# Patient Record
Sex: MALE | Race: WHITE | NOT HISPANIC OR LATINO
[De-identification: names, ages, dates, MRNs, and addresses within clinical notes are randomized per-mention and may not be internally consistent; named-entity substitution may affect disease eponyms.]

---

## 2018-11-28 PROBLEM — Z00.00 ENCOUNTER FOR PREVENTIVE HEALTH EXAMINATION: Status: ACTIVE | Noted: 2018-11-28

## 2018-12-10 ENCOUNTER — APPOINTMENT (OUTPATIENT)
Dept: OTOLARYNGOLOGY | Facility: CLINIC | Age: 48
End: 2018-12-10
Payer: COMMERCIAL

## 2018-12-10 VITALS
DIASTOLIC BLOOD PRESSURE: 80 MMHG | BODY MASS INDEX: 22.96 KG/M2 | HEIGHT: 69 IN | SYSTOLIC BLOOD PRESSURE: 124 MMHG | WEIGHT: 155 LBS

## 2018-12-10 DIAGNOSIS — Q85.01 NEUROFIBROMATOSIS, TYPE 1: ICD-10-CM

## 2018-12-10 DIAGNOSIS — Z78.9 OTHER SPECIFIED HEALTH STATUS: ICD-10-CM

## 2018-12-10 PROCEDURE — 99204 OFFICE O/P NEW MOD 45 MIN: CPT | Mod: 25

## 2018-12-10 PROCEDURE — 92550 TYMPANOMETRY & REFLEX THRESH: CPT

## 2018-12-10 PROCEDURE — 92504 EAR MICROSCOPY EXAMINATION: CPT

## 2018-12-10 PROCEDURE — 92557 COMPREHENSIVE HEARING TEST: CPT

## 2018-12-18 ENCOUNTER — FORM ENCOUNTER (OUTPATIENT)
Age: 48
End: 2018-12-18

## 2018-12-19 ENCOUNTER — OUTPATIENT (OUTPATIENT)
Dept: OUTPATIENT SERVICES | Facility: HOSPITAL | Age: 48
LOS: 1 days | Discharge: HOME | End: 2018-12-19

## 2018-12-19 DIAGNOSIS — H93.8X2 OTHER SPECIFIED DISORDERS OF LEFT EAR: ICD-10-CM

## 2018-12-28 ENCOUNTER — OTHER (OUTPATIENT)
Age: 48
End: 2018-12-28

## 2019-01-03 ENCOUNTER — APPOINTMENT (OUTPATIENT)
Dept: OTOLARYNGOLOGY | Facility: CLINIC | Age: 49
End: 2019-01-03

## 2019-01-06 ENCOUNTER — FORM ENCOUNTER (OUTPATIENT)
Age: 49
End: 2019-01-06

## 2019-01-07 ENCOUNTER — OUTPATIENT (OUTPATIENT)
Dept: OUTPATIENT SERVICES | Facility: HOSPITAL | Age: 49
LOS: 1 days | Discharge: HOME | End: 2019-01-07

## 2019-01-07 DIAGNOSIS — H93.8X2 OTHER SPECIFIED DISORDERS OF LEFT EAR: ICD-10-CM

## 2019-01-17 ENCOUNTER — APPOINTMENT (OUTPATIENT)
Dept: OTOLARYNGOLOGY | Facility: CLINIC | Age: 49
End: 2019-01-17

## 2019-03-14 ENCOUNTER — APPOINTMENT (OUTPATIENT)
Dept: OTOLARYNGOLOGY | Facility: CLINIC | Age: 49
End: 2019-03-14
Payer: COMMERCIAL

## 2019-03-14 DIAGNOSIS — H90.A21 SENSORINEURAL HEARING LOSS, UNILATERAL, RIGHT EAR, WITH RESTRICTED HEARING ON THE CONTRALATERAL SIDE: ICD-10-CM

## 2019-03-14 PROCEDURE — 99214 OFFICE O/P EST MOD 30 MIN: CPT

## 2019-03-14 NOTE — DATA REVIEWED
[de-identified] : Impression: \par \par Left temporal bone: Soft tissue lesion within Prussak's space with adjacent \par osseous erosions compatible with a pars flaccida cholesteatoma. Thinning or \par erosion of the tegmen tympani. This corresponds to the enhancing lesion seen \par on MRI. \par \par Right temporal bone: Unremarkable CT of the right temporal bone. 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 \par Recommend dental evaluation. \par \par \par \par EXAM: MR IAC ONLY WAW IC \par EXAM: MR BRAIN WAW IC \par \par \par PROCEDURE DATE: 12/19/2018 \par \par \par \par \par INTERPRETATION: Clinical History / Reason for exam: Left ear mass. \par \par TECHNIQUE: MRI brain and IACs with and without contrast. Multiplanar \par multisequential MRI of the brain and IACs was performed before and following \par the intravenous administration of 7 cc Gadavist (0.5 cc discarded) on the \par 1.5 Lindsay magnet. \par \par COMPARISON: None available. \par \par FINDINGS: \par \par MRI brain: \par \par The ventricles, basal cisterns and sulcal pattern are within normal limits \par for patients stated age. \par \par There is no acute mass effect, midline shift or hemorrhage. \par \par There are no acute infarcts on diffusion weighted images. \par \par There is a tortuous course of the right distal vertebral artery with \par adjacent mass effect on the right mid to lower, which may be a normal \par variant. \par \par There is left maxillary sinus mucosal thickening and a small retention cyst. \par \par Globes and orbits are grossly within normal limits. \par \par There is no bone marrow signal abnormality. The sella is unremarkable. \par \par There is prominence of retrocerebellar CSF space which may be related to an \par arachnoid cyst versus qasim cisterna magna. \par \par MRI IACs: \par \par Right side: The cerebellopontine angle cistern is unremarkable. The CNs VII \par and VIII are visualized. There is normal CSF signal within the cochlea, \par vestibule and semicircular canals. There is normal signal void within the \par mastoid air cells and external auditory canal. \par \par Left side: The CNs VII and VIII are visualized. The cerebellopontine angle \par cistern is unremarkable. There is normal CSF signal within the cochlea, \par vestibule and semicircular canals. There is apparent enhancement and \par thickening of the left tympanic membrane with a small focus of nodular \par enhancement anteriorly (series 15, image 31). Small fluid is noted within \par the left mastoid air cells. \par \par \par IMPRESSION: \par \par MRI brain: Essentially unremarkable study. \par \par MRI IACs: \par \par 1. Apparent linear and nodular enhancement anteriorly within the left \par middle ear; recommend CT temporal bone for more detailed evaluation. \par \par 2. Fluid within the left mastoid air cells. \par \par \par \par \par \par \par MÓNICA HARRIS M.D., RESIDENT RADIOLOGIST \par This document has been electronically signed. \par VALENTINE GOMEZ M.D., ATTENDING RADIOLOGIST \par This document has been electronically signed. Dec 20 2018 4:44PM \par

## 2019-03-14 NOTE — CONSULT LETTER
[Dear  ___] : Dear  [unfilled], [Consult Letter:] : I had the pleasure of evaluating your patient, [unfilled]. [Please see my note below.] : Please see my note below. [Consult Closing:] : Thank you very much for allowing me to participate in the care of this patient.  If you have any questions, please do not hesitate to contact me. [Sincerely,] : Sincerely, [FreeTextEntry2] : Dr. Jeff Sanabria [FreeTextEntry3] : Joan Colon MD\par Otolaryngology - Head & Neck Surgery\par

## 2019-03-14 NOTE — PHYSICAL EXAM
[Normal] : no rashes [de-identified] : left TM intact, pale white lesion in superior anterior aspect of TM, medial to TM. Right TM intact, no masses or lesions in middle ear

## 2019-03-14 NOTE — HISTORY OF PRESENT ILLNESS
[de-identified] : 49 yo M with pulsatile tinnitus and hearing loss since October 2018. Denies hx ear drainage, ear infections, ear surgeries. Audiogram demonstrates conductive hearing loss. CT scan done, suggests cholesteatoma left ear. MRI also done. No changes since previous visit.

## 2019-03-14 NOTE — ASSESSMENT
[FreeTextEntry1] : - patient with left ear epitympanic mass, likely cholesteatoma. Discussed role of left tympanomastoidectomy, canal wall up, possible canal wall down, facial nerve monitoring. Discussed risks, benefits, and alternatives to treatment in extensive detail. Patient asked questions and these were answered to his apparent satisfaction. Patient gave informed written consent.\par - f/up 1 week post-op\par

## 2019-05-07 ENCOUNTER — OUTPATIENT (OUTPATIENT)
Dept: OUTPATIENT SERVICES | Facility: HOSPITAL | Age: 49
LOS: 1 days | Discharge: HOME | End: 2019-05-07
Payer: COMMERCIAL

## 2019-05-07 VITALS
WEIGHT: 154.98 LBS | HEART RATE: 76 BPM | SYSTOLIC BLOOD PRESSURE: 130 MMHG | TEMPERATURE: 98 F | RESPIRATION RATE: 16 BRPM | DIASTOLIC BLOOD PRESSURE: 82 MMHG | OXYGEN SATURATION: 97 % | HEIGHT: 69 IN

## 2019-05-07 DIAGNOSIS — Z86.69 PERSONAL HISTORY OF OTHER DISEASES OF THE NERVOUS SYSTEM AND SENSE ORGANS: ICD-10-CM

## 2019-05-07 DIAGNOSIS — Z01.818 ENCOUNTER FOR OTHER PREPROCEDURAL EXAMINATION: ICD-10-CM

## 2019-05-07 DIAGNOSIS — Z98.890 OTHER SPECIFIED POSTPROCEDURAL STATES: Chronic | ICD-10-CM

## 2019-05-07 DIAGNOSIS — H90.A21 SENSORINEURAL HEARING LOSS, UNILATERAL, RIGHT EAR, WITH RESTRICTED HEARING ON THE CONTRALATERAL SIDE: ICD-10-CM

## 2019-05-07 LAB
ALBUMIN SERPL ELPH-MCNC: 4.7 G/DL — SIGNIFICANT CHANGE UP (ref 3.5–5.2)
ALP SERPL-CCNC: 60 U/L — SIGNIFICANT CHANGE UP (ref 30–115)
ALT FLD-CCNC: 36 U/L — SIGNIFICANT CHANGE UP (ref 0–41)
ANION GAP SERPL CALC-SCNC: 12 MMOL/L — SIGNIFICANT CHANGE UP (ref 7–14)
AST SERPL-CCNC: 20 U/L — SIGNIFICANT CHANGE UP (ref 0–41)
BASOPHILS # BLD AUTO: 0.03 K/UL — SIGNIFICANT CHANGE UP (ref 0–0.2)
BASOPHILS NFR BLD AUTO: 0.6 % — SIGNIFICANT CHANGE UP (ref 0–1)
BILIRUB SERPL-MCNC: 2 MG/DL — HIGH (ref 0.2–1.2)
BUN SERPL-MCNC: 13 MG/DL — SIGNIFICANT CHANGE UP (ref 10–20)
CALCIUM SERPL-MCNC: 9.5 MG/DL — SIGNIFICANT CHANGE UP (ref 8.5–10.1)
CHLORIDE SERPL-SCNC: 102 MMOL/L — SIGNIFICANT CHANGE UP (ref 98–110)
CO2 SERPL-SCNC: 27 MMOL/L — SIGNIFICANT CHANGE UP (ref 17–32)
CREAT SERPL-MCNC: 0.8 MG/DL — SIGNIFICANT CHANGE UP (ref 0.7–1.5)
EOSINOPHIL # BLD AUTO: 0.07 K/UL — SIGNIFICANT CHANGE UP (ref 0–0.7)
EOSINOPHIL NFR BLD AUTO: 1.4 % — SIGNIFICANT CHANGE UP (ref 0–8)
GLUCOSE SERPL-MCNC: 96 MG/DL — SIGNIFICANT CHANGE UP (ref 70–99)
HCT VFR BLD CALC: 46.7 % — SIGNIFICANT CHANGE UP (ref 42–52)
HGB BLD-MCNC: 16 G/DL — SIGNIFICANT CHANGE UP (ref 14–18)
IMM GRANULOCYTES NFR BLD AUTO: 0.4 % — HIGH (ref 0.1–0.3)
LYMPHOCYTES # BLD AUTO: 1.23 K/UL — SIGNIFICANT CHANGE UP (ref 1.2–3.4)
LYMPHOCYTES # BLD AUTO: 24 % — SIGNIFICANT CHANGE UP (ref 20.5–51.1)
MCHC RBC-ENTMCNC: 28.5 PG — SIGNIFICANT CHANGE UP (ref 27–31)
MCHC RBC-ENTMCNC: 34.3 G/DL — SIGNIFICANT CHANGE UP (ref 32–37)
MCV RBC AUTO: 83.1 FL — SIGNIFICANT CHANGE UP (ref 80–94)
MONOCYTES # BLD AUTO: 0.52 K/UL — SIGNIFICANT CHANGE UP (ref 0.1–0.6)
MONOCYTES NFR BLD AUTO: 10.2 % — HIGH (ref 1.7–9.3)
NEUTROPHILS # BLD AUTO: 3.25 K/UL — SIGNIFICANT CHANGE UP (ref 1.4–6.5)
NEUTROPHILS NFR BLD AUTO: 63.4 % — SIGNIFICANT CHANGE UP (ref 42.2–75.2)
NRBC # BLD: 0 /100 WBCS — SIGNIFICANT CHANGE UP (ref 0–0)
PLATELET # BLD AUTO: 219 K/UL — SIGNIFICANT CHANGE UP (ref 130–400)
POTASSIUM SERPL-MCNC: 4.2 MMOL/L — SIGNIFICANT CHANGE UP (ref 3.5–5)
POTASSIUM SERPL-SCNC: 4.2 MMOL/L — SIGNIFICANT CHANGE UP (ref 3.5–5)
PROT SERPL-MCNC: 7.3 G/DL — SIGNIFICANT CHANGE UP (ref 6–8)
RBC # BLD: 5.62 M/UL — SIGNIFICANT CHANGE UP (ref 4.7–6.1)
RBC # FLD: 12.3 % — SIGNIFICANT CHANGE UP (ref 11.5–14.5)
SODIUM SERPL-SCNC: 141 MMOL/L — SIGNIFICANT CHANGE UP (ref 135–146)
WBC # BLD: 5.12 K/UL — SIGNIFICANT CHANGE UP (ref 4.8–10.8)
WBC # FLD AUTO: 5.12 K/UL — SIGNIFICANT CHANGE UP (ref 4.8–10.8)

## 2019-05-07 PROCEDURE — 93010 ELECTROCARDIOGRAM REPORT: CPT

## 2019-05-07 NOTE — H&P PST ADULT - HISTORY OF PRESENT ILLNESS
48yr old male states has been hearing "muffled in the left ear"- for the last few months, WAS FOUND TO HAVE CHOLESTEATOMA -presents for left mastoidectomy, tympanomastoidectomy. Denies c/o CP, PALP, SOB, URI, FEVER, RASH OR UTI SYMPTOMS. Exercise matias 3-4 FOS.

## 2019-05-20 ENCOUNTER — OTHER (OUTPATIENT)
Age: 49
End: 2019-05-20

## 2019-05-20 DIAGNOSIS — G89.18 OTHER ACUTE POSTPROCEDURAL PAIN: ICD-10-CM

## 2019-05-21 ENCOUNTER — RESULT REVIEW (OUTPATIENT)
Age: 49
End: 2019-05-21

## 2019-05-21 ENCOUNTER — OUTPATIENT (OUTPATIENT)
Dept: OUTPATIENT SERVICES | Facility: HOSPITAL | Age: 49
LOS: 1 days | Discharge: HOME | End: 2019-05-21
Payer: COMMERCIAL

## 2019-05-21 ENCOUNTER — APPOINTMENT (OUTPATIENT)
Dept: OTOLARYNGOLOGY | Facility: AMBULATORY SURGERY CENTER | Age: 49
End: 2019-05-21
Payer: COMMERCIAL

## 2019-05-21 VITALS
SYSTOLIC BLOOD PRESSURE: 134 MMHG | WEIGHT: 154.98 LBS | RESPIRATION RATE: 15 BRPM | HEART RATE: 70 BPM | TEMPERATURE: 98 F | DIASTOLIC BLOOD PRESSURE: 79 MMHG | HEIGHT: 69 IN | OXYGEN SATURATION: 96 %

## 2019-05-21 VITALS
RESPIRATION RATE: 16 BRPM | SYSTOLIC BLOOD PRESSURE: 134 MMHG | HEART RATE: 84 BPM | OXYGEN SATURATION: 96 % | DIASTOLIC BLOOD PRESSURE: 83 MMHG

## 2019-05-21 DIAGNOSIS — Z98.890 OTHER SPECIFIED POSTPROCEDURAL STATES: Chronic | ICD-10-CM

## 2019-05-21 PROCEDURE — 88304 TISSUE EXAM BY PATHOLOGIST: CPT | Mod: 26

## 2019-05-21 PROCEDURE — 69310 REBUILD OUTER EAR CANAL: CPT | Mod: 59

## 2019-05-21 PROCEDURE — 69645 REVISE MIDDLE EAR & MASTOID: CPT | Mod: LT

## 2019-05-21 RX ORDER — SODIUM CHLORIDE 9 MG/ML
1000 INJECTION, SOLUTION INTRAVENOUS
Refills: 0 | Status: DISCONTINUED | OUTPATIENT
Start: 2019-05-21 | End: 2019-06-05

## 2019-05-21 RX ORDER — OXYCODONE AND ACETAMINOPHEN 5; 325 MG/1; MG/1
1 TABLET ORAL EVERY 4 HOURS
Refills: 0 | Status: DISCONTINUED | OUTPATIENT
Start: 2019-05-21 | End: 2019-05-21

## 2019-05-21 RX ORDER — MORPHINE SULFATE 50 MG/1
2 CAPSULE, EXTENDED RELEASE ORAL
Refills: 0 | Status: DISCONTINUED | OUTPATIENT
Start: 2019-05-21 | End: 2019-05-21

## 2019-05-21 RX ORDER — ONDANSETRON 8 MG/1
4 TABLET, FILM COATED ORAL ONCE
Refills: 0 | Status: DISCONTINUED | OUTPATIENT
Start: 2019-05-21 | End: 2019-06-05

## 2019-05-21 RX ADMIN — SODIUM CHLORIDE 100 MILLILITER(S): 9 INJECTION, SOLUTION INTRAVENOUS at 16:36

## 2019-05-21 NOTE — ASU DISCHARGE PLAN (ADULT/PEDIATRIC) - ACTIVITY LEVEL
no heavy lifting or strenuous activity for 2 weeks no heavy lifting or strenuous activity for 2 weeks. Do not blow nose for 2 weeks.

## 2019-05-21 NOTE — ASU DISCHARGE PLAN (ADULT/PEDIATRIC) - CARE PROVIDER_API CALL
Joan Colon)  Otolaryngology  [address] [place] [address], [address] [place] [address]  Phone: [phone]  Fax: [phone]  Follow Up Time: 1 week

## 2019-05-21 NOTE — ASU DISCHARGE PLAN (ADULT/PEDIATRIC) - PAIN MANAGEMENT
Prescription called to pharmacy/Can also take Tylenol and/or Motrin. Do not exceed 3500mg/day of Tylenol

## 2019-05-21 NOTE — BRIEF OPERATIVE NOTE - OPERATION/FINDINGS
cholesteatoma involving epitympanum, surrounding head of incus, extending medially to it cholesteatoma involving epitympanum, surrounding head of incus in all directions, all visualized disease removed

## 2019-05-21 NOTE — CHART NOTE - NSCHARTNOTEFT_GEN_A_CORE
PACU ANESTHESIA ADMISSION NOTE      Procedure: Left tympanomastoidectomy    Post op diagnosis:  Cholesteatoma of left mastoid      ____  Intubated  TV:______       Rate: ______      FiO2: ______    _x___  Patent Airway    ___x_  Full return of protective reflexes    __x__  Full recovery from anesthesia / back to baseline     Vitals:   T: 98          R: 18                 BP:    144/83              Sat:       95%            P: 93      Mental Status:  __x__ Awake   _____ Alert   _____ Drowsy   _____ Sedated    Nausea/Vomiting:  ____x NO  ______Yes,   See Post - Op Orders          Pain Scale (0-10):  _____    Treatment: _x___ None    ____ See Post - Op/PCA Orders    Post - Operative Fluids:   ____ Oral   ___x_ See Post - Op Orders    Plan: Discharge:   __x__Home       _____Floor     _____Critical Care    _____  Other:_________________    Comments:  Dr. Haywood present for extuabtion,.

## 2019-05-21 NOTE — ASU DISCHARGE PLAN (ADULT/PEDIATRIC) - ASU DC SPECIAL INSTRUCTIONSFT
Keep left ear cup in place for 48 hours. After 48 hours, ok to remove it. You can wear it during the day and at night while sleeping for comfort if you would like. If you decide not to wear the cup, do not remove anything in the ear and place a cotton ball in the left ear and secure it with a band-aid.

## 2019-05-21 NOTE — PRE-ANESTHESIA EVALUATION ADULT - NSANTHOSAYNRD_GEN_A_CORE
No. KEEGAN screening performed.  STOP BANG Legend: 0-2 = LOW Risk; 3-4 = INTERMEDIATE Risk; 5-8 = HIGH Risk

## 2019-05-23 LAB — SURGICAL PATHOLOGY STUDY: SIGNIFICANT CHANGE UP

## 2019-05-25 DIAGNOSIS — H90.12 CONDUCTIVE HEARING LOSS, UNILATERAL, LEFT EAR, WITH UNRESTRICTED HEARING ON THE CONTRALATERAL SIDE: ICD-10-CM

## 2019-05-25 DIAGNOSIS — H71.92 UNSPECIFIED CHOLESTEATOMA, LEFT EAR: ICD-10-CM

## 2019-05-25 DIAGNOSIS — Q85.01 NEUROFIBROMATOSIS, TYPE 1: ICD-10-CM

## 2019-05-31 ENCOUNTER — APPOINTMENT (OUTPATIENT)
Dept: OTOLARYNGOLOGY | Facility: CLINIC | Age: 49
End: 2019-05-31
Payer: COMMERCIAL

## 2019-05-31 PROBLEM — Z86.69 PERSONAL HISTORY OF OTHER DISEASES OF THE NERVOUS SYSTEM AND SENSE ORGANS: Chronic | Status: ACTIVE | Noted: 2019-05-07

## 2019-05-31 PROCEDURE — 99024 POSTOP FOLLOW-UP VISIT: CPT

## 2019-05-31 NOTE — CONSULT LETTER
[Dear  ___] : Dear  [unfilled], [Consult Letter:] : I had the pleasure of evaluating your patient, [unfilled]. [Please see my note below.] : Please see my note below. [Consult Closing:] : Thank you very much for allowing me to participate in the care of this patient.  If you have any questions, please do not hesitate to contact me. [Sincerely,] : Sincerely, [FreeTextEntry2] : Dr. Jeff Sanabria [FreeTextEntry1] : I took Mr Araya to the OR on 5/21/19 for cholesteatoma of the left ear. He is here today for his first post-op visit, overall doing well.  [FreeTextEntry3] : Joan Colon MD\par Otolaryngology - Head & Neck Surgery\par

## 2019-05-31 NOTE — HISTORY OF PRESENT ILLNESS
[de-identified] : 49 yo M with pulsatile tinnitus and hearing loss since October 2018. Denies hx ear drainage, ear infections, ear surgeries. Audiogram demonstrates conductive hearing loss. CT scan done, suggests cholesteatoma left ear. MRI also done. No changes since previous visit.  [FreeTextEntry1] : \par 5/31/19: Patient here post op CWD tympanomastoidectomy, facial nerve monitoring 5/21/19 for left ear cholesteatoma.   Patient states he has discomfort only at night, and rates his pain on a scale of  0-10 a 5.

## 2019-05-31 NOTE — ASSESSMENT
[FreeTextEntry1] : - start using abx otic gtt to left ear\par - f/up in 1 week\par - ok to return to work in 2 weeks after surgery

## 2019-05-31 NOTE — REASON FOR VISIT
[Post-Operative Visit] : a post-operative visit [FreeTextEntry2] : s/p  mastoidectomy, tympanomastoidectomy, facial nerve monitoring 5/21/19

## 2019-05-31 NOTE — PHYSICAL EXAM
[FreeTextEntry7] : post-auricular incision c/d/i [FreeTextEntry9] : packing in place, widened meatus. Lateral packing removed, additional packing remains in place [Normal] : no rashes

## 2019-06-06 ENCOUNTER — APPOINTMENT (OUTPATIENT)
Dept: OTOLARYNGOLOGY | Facility: CLINIC | Age: 49
End: 2019-06-06
Payer: COMMERCIAL

## 2019-06-06 PROCEDURE — 99024 POSTOP FOLLOW-UP VISIT: CPT

## 2019-06-06 NOTE — DATA REVIEWED
[de-identified] : relevant images and reports personally reviewed by me:\par \par  Pathology             Final\par \par No Documents Attached\par \par \par \par \par   Mary Accession Number : [id number]\par \par MARCOJASON HELLER JR             1\par \par \par \par Surgical Final Report\par \par \par \par \par Final Diagnosis\par Left epitympanic mass:\par - Histologic features consistent with cholesteatoma.\par \par Verified by: Adriano Pacheco\par (Electronic Signature)\par Reported on: 05/23/19 18:36 EDT, [address] [place] [address],[facility] [id number]\par _________________________________________________________________\par \par Clinical History\par Left tympanmastoidectomy, meatoplasty\par \par Specimen(s) Submitted\par Left epitympanic mass\par \par Gross Description\par The specimen is received in formalin, labeled "left epitympanic\par mass" and consists of three fragments of tiny brownish yellow\par soft tissue, measuring 0.5 x 0.3 x 0.2 cm in aggregate. The\par specimen is submitted entirely. (1 block)\par \par Specimen was received and underwent gross examination at [facility], [address] [place] [address],[facility] [address].\par \par 05/22/19 13:14 joel\par \par Perioperative Diagnosis\par Left ear cholesteatoma\par \par  \par \par  Ordered by: MADALYN HERMOSILLO       Collected/Examined: 21May2019 01:57PM       \par Verified by: MADALYN HERMOSILLO 24May2019 09:00AM       \par  Result Communication: No patient communication needed at this time;\par Stage: Final       \par  Performed at: [facility]       Resulted: 23May2019 06:36PM       Last Updated: 24May2019 09:00AM       Accession: [id number]

## 2019-06-06 NOTE — ASSESSMENT
[FreeTextEntry1] : - granulation tissue treated in left EAC meatus\par - continue ear drops\par - f/up 2 weeks

## 2019-06-06 NOTE — HISTORY OF PRESENT ILLNESS
[de-identified] : 49 yo M with pulsatile tinnitus and hearing loss since October 2018. Denies hx ear drainage, ear infections, ear surgeries. Audiogram demonstrates conductive hearing loss. CT scan done, suggests cholesteatoma left ear. MRI also done. No changes since previous visit. \par \par 5/31/19: Patient here post op CWD tympanomastoidectomy, facial nerve monitoring 5/21/19 for left ear cholesteatoma.   Patient states he has discomfort only at night, and rates his pain on a scale of  0-10 a 5.  [FreeTextEntry1] : \par 6/6/19: 2nd post-op visit. Using ear drops, occasionally sharp pain that lasts less than a second, otherwise no issues.

## 2019-06-06 NOTE — PHYSICAL EXAM
[FreeTextEntry9] :  packing in place, widened meatus. ~2/3 of remaining packing removed. Granulation tissue present at meatus, treated with silver nitrate. Post-auricular incision c/d/i, healing well [Normal] : no rashes

## 2019-06-24 ENCOUNTER — APPOINTMENT (OUTPATIENT)
Dept: OTOLARYNGOLOGY | Facility: CLINIC | Age: 49
End: 2019-06-24
Payer: COMMERCIAL

## 2019-06-24 PROCEDURE — 99024 POSTOP FOLLOW-UP VISIT: CPT

## 2019-06-24 NOTE — HISTORY OF PRESENT ILLNESS
[de-identified] : 47 yo M with pulsatile tinnitus and hearing loss since October 2018. Denies hx ear drainage, ear infections, ear surgeries. Audiogram demonstrates conductive hearing loss. CT scan done, suggests cholesteatoma left ear. MRI also done. No changes since previous visit. \par \par 5/31/19: Patient here post op CWD tympanomastoidectomy, facial nerve monitoring 5/21/19 for left ear cholesteatoma.   Patient states he has discomfort only at night, and rates his pain on a scale of  0-10 a 5. \par \par 6/6/19: 2nd post-op visit. Using ear drops, occasionally sharp pain that lasts less than a second, otherwise no issues. [FreeTextEntry1] : \par  6/24/19: using ear drops, No pain. No issues. Back at work.

## 2019-06-24 NOTE — REASON FOR VISIT
[Post-Operative Visit] : a post-operative visit [FreeTextEntry2] : s/p  mastoidectomy, tympanomastoidectomy, facial nerve monitoring 5/21/19 f/up

## 2019-06-24 NOTE — PHYSICAL EXAM
[FreeTextEntry9] :  packing in place, widened meatus. remainder of packing removed, healing/epithelializing tissue.  Post-auricular incision c/d/i, healing well [Normal] : no rashes

## 2019-06-24 NOTE — ASSESSMENT
[FreeTextEntry1] : - continues to heal well\par - continue ear drops\par - ok for swimming pool, dry ear immediately after with hair dryer, avoid prolonged submerging of head\par - f/up in 1 month

## 2019-07-23 ENCOUNTER — APPOINTMENT (OUTPATIENT)
Dept: OTOLARYNGOLOGY | Facility: CLINIC | Age: 49
End: 2019-07-23
Payer: COMMERCIAL

## 2019-07-23 PROCEDURE — 99024 POSTOP FOLLOW-UP VISIT: CPT

## 2019-07-23 NOTE — ASSESSMENT
[FreeTextEntry1] : - well healed s/p CWD tympanomastoidectomy\par - ok to resume normal activity including swimming\par - will obtain CT temporal bone for new baseline exam. Will call with abnormal results\par - f/up in 4 months

## 2019-07-23 NOTE — PHYSICAL EXAM
[FreeTextEntry9] :  widened meatus, TM intact. Post-auricular incision well healed [Normal] : orientation to person, place, and time: normal

## 2019-07-23 NOTE — CONSULT LETTER
[Consult Letter:] : I had the pleasure of evaluating your patient, [unfilled]. [Dear  ___] : Dear  [unfilled], [Please see my note below.] : Please see my note below. [Sincerely,] : Sincerely, [Consult Closing:] : Thank you very much for allowing me to participate in the care of this patient.  If you have any questions, please do not hesitate to contact me. [FreeTextEntry2] : Dr. Jeff Sanabria  [FreeTextEntry3] : Joan Colon MD\par Otolaryngology - Head & Neck Surgery\par

## 2019-07-23 NOTE — REASON FOR VISIT
[Post-Operative Visit] : a post-operative visit [FreeTextEntry2] : s/p  CWD tympanomastoidectomy, facial nerve monitoring 5/21/19 f/up

## 2019-07-23 NOTE — HISTORY OF PRESENT ILLNESS
[FreeTextEntry1] : 7/23/19 Patient is following up for h/o cholesteatoma of the left ear, doing well. No issues. Has been swimming but not submerging head.  [de-identified] : 47 yo M with pulsatile tinnitus and hearing loss since October 2018. Denies hx ear drainage, ear infections, ear surgeries. Audiogram demonstrates conductive hearing loss. CT scan done, suggests cholesteatoma left ear. MRI also done. No changes since previous visit. \par \par 5/31/19: Patient here post op CWD tympanomastoidectomy, facial nerve monitoring 5/21/19 for left ear cholesteatoma.   Patient states he has discomfort only at night, and rates his pain on a scale of  0-10 a 5. \par \par 6/6/19: 2nd post-op visit. Using ear drops, occasionally sharp pain that lasts less than a second, otherwise no issues.\par \par  6/24/19: using ear drops, No pain. No issues. Back at work.

## 2019-09-05 ENCOUNTER — FORM ENCOUNTER (OUTPATIENT)
Age: 49
End: 2019-09-05

## 2019-09-06 ENCOUNTER — OUTPATIENT (OUTPATIENT)
Dept: OUTPATIENT SERVICES | Facility: HOSPITAL | Age: 49
LOS: 1 days | Discharge: HOME | End: 2019-09-06
Payer: COMMERCIAL

## 2019-09-06 DIAGNOSIS — H90.A32 MIXED CONDUCTIVE AND SENSORINEURAL HEARING LOSS, UNILATERAL, LEFT EAR WITH RESTRICTED HEARING ON THE CONTRALATERAL SIDE: ICD-10-CM

## 2019-09-06 DIAGNOSIS — H71.92 UNSPECIFIED CHOLESTEATOMA, LEFT EAR: ICD-10-CM

## 2019-09-06 DIAGNOSIS — Z98.890 OTHER SPECIFIED POSTPROCEDURAL STATES: Chronic | ICD-10-CM

## 2019-09-06 PROCEDURE — 70480 CT ORBIT/EAR/FOSSA W/O DYE: CPT | Mod: 26

## 2019-11-13 ENCOUNTER — APPOINTMENT (OUTPATIENT)
Dept: OTOLARYNGOLOGY | Facility: CLINIC | Age: 49
End: 2019-11-13
Payer: COMMERCIAL

## 2019-11-13 DIAGNOSIS — H61.22 IMPACTED CERUMEN, LEFT EAR: ICD-10-CM

## 2019-11-13 PROCEDURE — 99213 OFFICE O/P EST LOW 20 MIN: CPT | Mod: 25

## 2019-11-13 PROCEDURE — 69210 REMOVE IMPACTED EAR WAX UNI: CPT

## 2019-11-13 RX ORDER — AMOXICILLIN AND CLAVULANATE POTASSIUM 875; 125 MG/1; MG/1
875-125 TABLET, COATED ORAL
Qty: 20 | Refills: 0 | Status: DISCONTINUED | COMMUNITY
Start: 2019-03-23 | End: 2019-11-13

## 2019-11-13 RX ORDER — NEOMYCIN AND POLYMYXIN B SULFATES AND HYDROCORTISONE OTIC 10; 3.5; 1 MG/ML; MG/ML; [USP'U]/ML
3.5-10000-1 SUSPENSION AURICULAR (OTIC) 4 TIMES DAILY
Qty: 1 | Refills: 0 | Status: DISCONTINUED | COMMUNITY
Start: 2018-12-10 | End: 2019-11-13

## 2019-11-13 RX ORDER — OXYCODONE AND ACETAMINOPHEN 5; 325 MG/1; MG/1
5-325 TABLET ORAL
Qty: 10 | Refills: 0 | Status: DISCONTINUED | COMMUNITY
Start: 2019-05-20 | End: 2019-11-13

## 2019-11-13 RX ORDER — OFLOXACIN OTIC 3 MG/ML
0.3 SOLUTION AURICULAR (OTIC)
Qty: 1 | Refills: 2 | Status: DISCONTINUED | COMMUNITY
Start: 2019-05-31 | End: 2019-11-13

## 2019-11-13 NOTE — DATA REVIEWED
[de-identified] : relevant images and reports personally reviewed by me:\par EXAM: CT ORBITS \par PROCEDURE DATE: 09/06/2019 \par INTERPRETATION: Clinical History / Reason for exam: Cholesteatoma, s/p CWD tympanomastoidectomy. Follow \par up \par Technique: CT of the temporal bones without contrast. Contiguous unenhanced CT axial images of the temporal \par bones without intravenous contrast, with targeted axial, coronal and sagittal reformats of each side. \par Comparison: CT temporal bones 1/7/2019, MRI IACs 12/19/2018 \par Findings: \par Right side: The external auditory canal appears normal. The middle ear cavity is clear. The ossicles are intact \par without evidence of erosion. The inner ear structures appear morphologically unremarkable. The right mastoid \par air cells are well developed and well aerated. The vascular structures and course and caliber of the facial nerve \par appear normal. \par Left temporal bone: The patient is status post interval left canal wall down mastoidectomy. Redemonstrated is \par erosion of the incus. There is persistent small amount of soft tissue within the lateral portion of the epitympanic \par space on series 202 images 50-51. The remainder of the ossicles are intact. The tegmen tympani is again noted \par to be thinned or eroded. The mastoid tip air cells are again noted to be opacified. The inner ear structures \par appear morphologically unremarkable. \par Again noted is extensive lucency is noted within the left maxillary alveolar ridge involving multiple teeth. \par IMPRESSION: \par Since the CT temporal bones dated 1/7/2019: \par 1. Patient is status post interval left canal wall down mastoidectomy for cholesteatoma resection. \par 2. Persistent small amount of soft tissue within the left Prussak's space, series 202 images 50-51.\par Redemonstrated erosion of the incus and thinning of the subjacent tegmen tympani. \par 3. Redemonstrated left maxillary alveolar ridge extensive erosion/lucency involving multiple teeth.

## 2019-11-13 NOTE — REASON FOR VISIT
[Subsequent Evaluation] : a subsequent evaluation for [FreeTextEntry2] : ct results, hx cholesteatoma

## 2019-11-13 NOTE — CONSULT LETTER
[Dear  ___] : Dear  [unfilled], [Consult Letter:] : I had the pleasure of evaluating your patient, [unfilled]. [Please see my note below.] : Please see my note below. [FreeTextEntry2] : Dr. Jeff Sanabria \par  [Consult Closing:] : Thank you very much for allowing me to participate in the care of this patient.  If you have any questions, please do not hesitate to contact me. [Sincerely,] : Sincerely, [FreeTextEntry3] : Joan Colon MD\par Otolaryngology - Head & Neck Surgery\par

## 2019-11-13 NOTE — ASSESSMENT
[FreeTextEntry1] : - cerumen removed from the left ear\par - no evidence of recurrent cholesteatoma\par - f/up in 6 months

## 2019-11-13 NOTE — HISTORY OF PRESENT ILLNESS
[de-identified] : 49 yo M with pulsatile tinnitus and hearing loss since October 2018. Denies hx ear drainage, ear infections, ear surgeries. Audiogram demonstrates conductive hearing loss. CT scan done, suggests cholesteatoma left ear. MRI also done. No changes since previous visit. \par \par 5/31/19: Patient here post op CWD tympanomastoidectomy, facial nerve monitoring 5/21/19 for left ear cholesteatoma.   Patient states he has discomfort only at night, and rates his pain on a scale of  0-10 a 5. \par \par 6/6/19: 2nd post-op visit. Using ear drops, occasionally sharp pain that lasts less than a second, otherwise no issues.\par \par  6/24/19: using ear drops, No pain. No issues. Back at work. \par \par 7/23/19 Patient is following up for h/o cholesteatoma of the left ear, doing well. No issues. Has been swimming but not submerging head.  [FreeTextEntry1] : \par 11/13/19 Patient is following up for CT results. he has a h/o cholesteatoma of the left ear s/p CWD 5/21/19. Patient states he is doing well. no hearing loss. No ear pain.

## 2019-11-13 NOTE — PHYSICAL EXAM
[FreeTextEntry9] :  widened meatus, TM intact. Post-auricular incision well healed. Cerumen in mastoid bowl, see procedures [Midline] : trachea located in midline position [Normal] : no rashes

## 2020-05-14 ENCOUNTER — APPOINTMENT (OUTPATIENT)
Dept: OTOLARYNGOLOGY | Facility: CLINIC | Age: 50
End: 2020-05-14

## 2020-07-24 ENCOUNTER — APPOINTMENT (OUTPATIENT)
Dept: OTOLARYNGOLOGY | Facility: CLINIC | Age: 50
End: 2020-07-24
Payer: COMMERCIAL

## 2020-07-24 DIAGNOSIS — H93.8X9 OTHER SPECIFIED DISORDERS OF EAR, UNSPECIFIED EAR: ICD-10-CM

## 2020-07-24 PROCEDURE — 92557 COMPREHENSIVE HEARING TEST: CPT

## 2020-07-24 PROCEDURE — 92570 ACOUSTIC IMMITANCE TESTING: CPT

## 2020-07-24 PROCEDURE — 99214 OFFICE O/P EST MOD 30 MIN: CPT | Mod: 25

## 2020-07-24 NOTE — DATA REVIEWED
[de-identified] : 7/24/20: type A tymp right; type B tymp left; right ear: hearing WNL sloping to mild to mod-severe SNHL; left mod to mod-severe mixed HL

## 2020-07-24 NOTE — CONSULT LETTER
[Dear  ___] : Dear  [unfilled], [Please see my note below.] : Please see my note below. [Consult Letter:] : I had the pleasure of evaluating your patient, [unfilled]. [Consult Closing:] : Thank you very much for allowing me to participate in the care of this patient.  If you have any questions, please do not hesitate to contact me. [FreeTextEntry2] : Dr. Jeff Sanabria [Sincerely,] : Sincerely, [FreeTextEntry3] : Joan Colon MD\par Otolaryngology - Head & Neck Surgery\par

## 2020-07-24 NOTE — PHYSICAL EXAM
[FreeTextEntry9] :  widened meatus, TM intact. Post-auricular incision well healed. clean mastoid bowl [Midline] : trachea located in midline position [Normal] : no rashes

## 2020-07-24 NOTE — ASSESSMENT
[FreeTextEntry1] : - reviewed audiogram no worsening of hearing left ear\par - will proceed with CT temporal bone, most recent was nearly 1 year ago\par - will call [phone]

## 2020-08-10 ENCOUNTER — OUTPATIENT (OUTPATIENT)
Dept: OUTPATIENT SERVICES | Facility: HOSPITAL | Age: 50
LOS: 1 days | Discharge: HOME | End: 2020-08-10
Payer: COMMERCIAL

## 2020-08-10 DIAGNOSIS — H71.92 UNSPECIFIED CHOLESTEATOMA, LEFT EAR: ICD-10-CM

## 2020-08-10 DIAGNOSIS — Z98.890 OTHER SPECIFIED POSTPROCEDURAL STATES: Chronic | ICD-10-CM

## 2020-08-10 PROCEDURE — 70480 CT ORBIT/EAR/FOSSA W/O DYE: CPT | Mod: 26

## 2021-10-12 ENCOUNTER — NON-APPOINTMENT (OUTPATIENT)
Age: 51
End: 2021-10-12

## 2021-10-14 ENCOUNTER — RESULT REVIEW (OUTPATIENT)
Age: 51
End: 2021-10-14

## 2021-10-14 ENCOUNTER — APPOINTMENT (OUTPATIENT)
Dept: OTOLARYNGOLOGY | Facility: CLINIC | Age: 51
End: 2021-10-14
Payer: COMMERCIAL

## 2021-10-14 DIAGNOSIS — H93.8X2 OTHER SPECIFIED DISORDERS OF LEFT EAR: ICD-10-CM

## 2021-10-14 PROCEDURE — 92557 COMPREHENSIVE HEARING TEST: CPT

## 2021-10-14 PROCEDURE — 92550 TYMPANOMETRY & REFLEX THRESH: CPT

## 2021-10-14 PROCEDURE — 99213 OFFICE O/P EST LOW 20 MIN: CPT | Mod: 25

## 2021-10-14 NOTE — DATA REVIEWED
[de-identified] :  - [facility] \par  [de-identified] : \par  CT Temporal Bones No Cont             Final\par \par No Documents Attached\par \par \par \par \par   EXAM:  CT ORBITS\par \par \par PROCEDURE DATE:  08/10/2020\par \par \par \par \par INTERPRETATION:  CT EXAMINATION OF THE TEMPORAL BONES\par \par CLINICAL INDICATION: Left ear cholesteatoma follow-up. Patient complains of bilateral hearing loss.\par \par TECHNIQUE: Multidetector axial CT images of the temporal bones were obtained without intravenous contrast. Target axial, coronal and Poschl images were created through each temporal bone in bone windows.\par \par \par COMPARISON: CT of the temporal bone dated 9/6/2019.\par \par FINDINGS:\par \par RIGHT:\par Mastoid air cells: The mastoid air cells are well developed and clear. The tegmen mastoideum is intact.\par \par Outer ear: The external auditory canal appears normal.  The tympanic membrane is intact.\par \par Middle ear: The middle ear cavity is clear. The ossicles and scutum are intact without erosion. The tegmen tympani is intact.\par \par Inner ear: The otic capsule and inner ear structures appear intact. There is slight asymmetric enlargement of the right vestibular aqueduct which measures 1.7 mm in maximum diameter, better appreciated on prior study due to technique. No associated lytic lesion.\par \par Facial nerve canal: Normal course and caliber.\par \par Vascular structures:  The sigmoid plate is intact.  The carotid canal is covered by bone.\par \par Internal auditory canal: Normal in size\par \par \par LEFT:\par Mastoid air cells: The patient is again noted to be status post canal wall down mastoidectomy with increased aeration of the mastoid bowl. There is opacification of the residual left mastoid air cell. There is increased mineralization/sclerotic changes of the mastoid septum. The tegmen mastoideum is intact.\par \par Outer ear:  The external auditory canal appears normal. There is stable medialization of the tympanic membrane.\par \par Middle ear: There is redemonstration of scattered soft tissue density in the left epit/mesotympanum lining the partially eroded ossicles (malleus and incus), and the Prussak's space. There is stable partial erosion of the scutum. There is slightly decreased demineralization of the tegmen tympani compared to the prior study.\par \par Inner ear: The otic capsule and inner ear structures appear intact. The vestibular aqueduct is normal in size.\par \par Facial nerve canal: Normal course and caliber.\par \par Vascular structures:  The sigmoid plate is intact.  The carotid canal is covered by bone.\par \par Internal auditory canal: Normal in size\par \par \par Other:\par Partially visualized intracranial structures: Normal.\par \par Partially visualized paranasal sinuses: Small retention cyst in the left maxillary sinus.\par \par \par IMPRESSION:\par \par Evolving postsurgical changes status post left canal wall down mastoidectomy for cholesteatoma resection with increased aeration of the mastoid bowl, and mineralization/sclerotic changes of the residual mastoid septum. 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 Aug 11 2020  2:01PM\par \par \par \par \par  \par  [id number]\par \par  \par \par  Ordered by: MADALYN HERMOSILLO       Collected/Examined: 10Aug2020 03:35PM       \par Verified by: MADALYN HERMOSILLO [id number] 11:15AM       \par  Result Communication: Discussed results with patient;\par Stage: Final       \par  [facility]       Resulted: 11Aug2020 02:05PM       Last Updated: 21Aug2020 11:15AM       Accession: [id number]

## 2021-10-14 NOTE — HISTORY OF PRESENT ILLNESS
[de-identified] : 47 yo M with pulsatile tinnitus and hearing loss since October 2018. Denies hx ear drainage, ear infections, ear surgeries. Audiogram demonstrates conductive hearing loss. CT scan done, suggests cholesteatoma left ear. MRI also done. No changes since previous visit. \par \par 5/31/19: Patient here post op CWD tympanomastoidectomy, facial nerve monitoring 5/21/19 for left ear cholesteatoma.   Patient states he has discomfort only at night, and rates his pain on a scale of  0-10 a 5. \par \par 6/6/19: 2nd post-op visit. Using ear drops, occasionally sharp pain that lasts less than a second, otherwise no issues.\par \par  6/24/19: using ear drops, No pain. No issues. Back at work. \par \par 7/23/19 Patient is following up for h/o cholesteatoma of the left ear, doing well. No issues. Has been swimming but not submerging head. \par \par \par 11/13/19 Patient is following up for CT results. he has a h/o cholesteatoma of the left ear s/p CWD 5/21/19. Patient states he is doing well. no hearing loss. No ear pain. \par \par \par 7/24/2020: Patient following up on cholesteatoma of left ear. Patient admits left ear clogged. Patient denies any otalgia. He admits since clogged feeling has been feeling dizzy. Room spinning and unbalanced feeling.  [FreeTextEntry1] : \par 10/14/21:

## 2021-10-14 NOTE — PHYSICAL EXAM
[Normal] : mucosa is normal [Midline] : trachea located in midline position [de-identified] : left tm repaired

## 2021-10-27 ENCOUNTER — OUTPATIENT (OUTPATIENT)
Dept: OUTPATIENT SERVICES | Facility: HOSPITAL | Age: 51
LOS: 1 days | Discharge: HOME | End: 2021-10-27
Payer: COMMERCIAL

## 2021-10-27 DIAGNOSIS — Z98.890 OTHER SPECIFIED POSTPROCEDURAL STATES: Chronic | ICD-10-CM

## 2021-10-27 DIAGNOSIS — H71.92 UNSPECIFIED CHOLESTEATOMA, LEFT EAR: ICD-10-CM

## 2021-10-27 PROCEDURE — 70480 CT ORBIT/EAR/FOSSA W/O DYE: CPT | Mod: 26

## 2021-11-22 ENCOUNTER — APPOINTMENT (OUTPATIENT)
Dept: OTOLARYNGOLOGY | Facility: CLINIC | Age: 51
End: 2021-11-22
Payer: COMMERCIAL

## 2021-11-22 DIAGNOSIS — H90.A32 MIXED CONDUCTIVE AND SENSORINEURAL HEARING, UNILATERAL, LEFT EAR WITH RESTRICTED HEARING ON THE  CONTRALATERAL SIDE: ICD-10-CM

## 2021-11-22 DIAGNOSIS — H71.92 UNSPECIFIED CHOLESTEATOMA, LEFT EAR: ICD-10-CM

## 2021-11-22 DIAGNOSIS — H70.12 CHRONIC MASTOIDITIS, LEFT EAR: ICD-10-CM

## 2021-11-22 PROCEDURE — 69220 CLEAN OUT MASTOID CAVITY: CPT | Mod: LT

## 2021-11-22 PROCEDURE — 99214 OFFICE O/P EST MOD 30 MIN: CPT | Mod: 25

## 2021-11-22 NOTE — DATA REVIEWED
[de-identified] : EXAM:  CT ORBITS\par \par \par PROCEDURE DATE:  10/27/2021\par \par \par \par \par INTERPRETATION:  INDICATION: Cholesteatoma.\par \par TECHNIQUE:  0.6mm thin axial images through the temporal bones were obtained without contrast.  Retro- reformatted high resolution images were obtained. Coronal reformatted images were obtained.\par \par COMPARISON EXAMINATION:  Temporal bone CT dated 8/10/2020..\par \par FINDINGS:\par RIGHT EXTERNAL AUDITORY CANAL: Normal.\par RIGHT TYMPANOMASTOID CAVITY:  Well developed and fully aerated.\par RIGHT OSSICULAR CHAIN:  Intact.\par RIGHT OTIC CAPSULE STRUCTURES:  Similar appearance of slightly widened vestibular aqueduct measuring 1.7 mm, nonspecific. Aortic capsule structures are otherwise unremarkable.\par RIGHT FACIAL NERVE CANAL:  Intact.\par \par \par LEFT TYMPANOMASTOID CAVITY:  Similar postoperative appearance status post left canal wall down mastoidectomy with minimal soft tissue noted involving the epitympanum as well as within Prussak's space.\par LEFT OSSICULAR CHAIN:  There is similar demineralization of the incus. The remaining ossicles appear intact the slightly demineralized..\par LEFT OTIC CAPSULE STRUCTURES:  There is left superior semicircular canal thinning versus dehiscence.\par LEFT FACIAL NERVE CANAL:  Intact.\par \par VISUALIZED INTRACRANIAL STRUCTURES AND ORBITAL CONTENTS:  Unremarkable.\par MISCELLANEOUS:  Similar appearance of extensive erosive changes involving the left maxillary alveolus.\par \par IMPRESSION:\par No significant change since prior CT of 8/10/2020.\par \par Stable postoperative appearance of the left tympanomastoid cavity with minimal residual soft tissue filling the mastoidectomy bowl as well as the epitympanum possibly representing residual cholesteatoma versus granulation tissue. Unchanged partial demineralization of the ossicles.\par \par Left superior semicircular canal thinning versus dehiscence.\par \par --- End of Report ---\par \par \par \par

## 2021-11-22 NOTE — PHYSICAL EXAM
[Midline] : trachea located in midline position [Normal] : tympanic membranes are normal in both ears [de-identified] : vholesteatoma at left

## 2021-11-22 NOTE — HISTORY OF PRESENT ILLNESS
[de-identified] : 49 yo M with pulsatile tinnitus and hearing loss since October 2018. Denies hx ear drainage, ear infections, ear surgeries. Audiogram demonstrates conductive hearing loss. CT scan done, suggests cholesteatoma left ear. MRI also done. No changes since previous visit. \par \par 5/31/19: Patient here post op CWD tympanomastoidectomy, facial nerve monitoring 5/21/19 for left ear cholesteatoma.   Patient states he has discomfort only at night, and rates his pain on a scale of  0-10 a 5. \par \par 6/6/19: 2nd post-op visit. Using ear drops, occasionally sharp pain that lasts less than a second, otherwise no issues.\par \par  6/24/19: using ear drops, No pain. No issues. Back at work. \par \par 7/23/19 Patient is following up for h/o cholesteatoma of the left ear, doing well. No issues. Has been swimming but not submerging head. \par \par \par 11/13/19 Patient is following up for CT results. he has a h/o cholesteatoma of the left ear s/p CWD 5/21/19. Patient states he is doing well. no hearing loss. No ear pain. \par \par \par 7/24/2020: Patient following up on cholesteatoma of left ear. Patient admits left ear clogged. Patient denies any otalgia. He admits since clogged feeling has been feeling dizzy. Room spinning and unbalanced feeling.  [FreeTextEntry1] : 11/22/21: Patient following up on left ear cholesteatoma. Patient sent for CT scan; here to discuss results .  Denies any recent dizziness.

## 2024-07-11 NOTE — HISTORY OF PRESENT ILLNESS
[FreeTextEntry1] : \par 7/24/2020: Patient following up on cholesteatoma of left ear. Patient admits left ear clogged. Patient denies any otalgia. He admits since clogged feeling has been feeling dizzy. Room spinning and unbalanced feeling.  [de-identified] : 49 yo M with pulsatile tinnitus and hearing loss since October 2018. Denies hx ear drainage, ear infections, ear surgeries. Audiogram demonstrates conductive hearing loss. CT scan done, suggests cholesteatoma left ear. MRI also done. No changes since previous visit. \par \par 5/31/19: Patient here post op CWD tympanomastoidectomy, facial nerve monitoring 5/21/19 for left ear cholesteatoma.   Patient states he has discomfort only at night, and rates his pain on a scale of  0-10 a 5. \par \par 6/6/19: 2nd post-op visit. Using ear drops, occasionally sharp pain that lasts less than a second, otherwise no issues.\par \par  6/24/19: using ear drops, No pain. No issues. Back at work. \par \par 7/23/19 Patient is following up for h/o cholesteatoma of the left ear, doing well. No issues. Has been swimming but not submerging head. \par \par \par 11/13/19 Patient is following up for CT results. he has a h/o cholesteatoma of the left ear s/p CWD 5/21/19. Patient states he is doing well. no hearing loss. No ear pain.  No